# Patient Record
Sex: FEMALE | Race: WHITE | NOT HISPANIC OR LATINO | URBAN - METROPOLITAN AREA
[De-identification: names, ages, dates, MRNs, and addresses within clinical notes are randomized per-mention and may not be internally consistent; named-entity substitution may affect disease eponyms.]

---

## 2021-02-15 ENCOUNTER — MEDICAL CORRESPONDENCE (OUTPATIENT)
Dept: HEALTH INFORMATION MANAGEMENT | Facility: CLINIC | Age: 21
End: 2021-02-15

## 2021-02-15 ENCOUNTER — TRANSFERRED RECORDS (OUTPATIENT)
Dept: HEALTH INFORMATION MANAGEMENT | Facility: CLINIC | Age: 21
End: 2021-02-15

## 2021-02-18 ENCOUNTER — TRANSCRIBE ORDERS (OUTPATIENT)
Dept: OTHER | Age: 21
End: 2021-02-18

## 2021-02-18 DIAGNOSIS — R14.0 ABDOMINAL BLOATING: ICD-10-CM

## 2021-02-18 DIAGNOSIS — R14.1 ABDOMINAL GAS PAIN: ICD-10-CM

## 2021-02-18 DIAGNOSIS — K62.3 RECTAL PROLAPSE: Primary | ICD-10-CM

## 2022-10-17 ENCOUNTER — MEDICAL CORRESPONDENCE (OUTPATIENT)
Dept: HEALTH INFORMATION MANAGEMENT | Facility: CLINIC | Age: 22
End: 2022-10-17

## 2022-10-17 ENCOUNTER — TRANSFERRED RECORDS (OUTPATIENT)
Dept: HEALTH INFORMATION MANAGEMENT | Facility: CLINIC | Age: 22
End: 2022-10-17

## 2022-10-18 ENCOUNTER — TRANSFERRED RECORDS (OUTPATIENT)
Dept: HEALTH INFORMATION MANAGEMENT | Facility: CLINIC | Age: 22
End: 2022-10-18

## 2022-10-20 ENCOUNTER — TRANSCRIBE ORDERS (OUTPATIENT)
Dept: OTHER | Age: 22
End: 2022-10-20

## 2022-10-20 DIAGNOSIS — J35.8 TONSILLOLITH: Primary | ICD-10-CM

## 2022-10-21 NOTE — TELEPHONE ENCOUNTER
FUTURE VISIT INFORMATION      FUTURE VISIT INFORMATION:    Date: 1/20/23    Time: 9am    [place] [facility] [place]  REFERRAL INFORMATION:    Referring provider:  Dr Rosa Elena Garcia    Referring providers clinic:  [facility]    Reason for visit/diagnosis  Per pt/ Tonsillolith/ Ref Dr Rosa Elena Garcia @ [place]    RECORDS REQUESTED FROM:       Clinic name Comments Records Status Imaging Status   [facility] 10/18/22-- note from Dr Rosa Elena Garcia   Scanned in epic

## 2023-01-20 ENCOUNTER — OFFICE VISIT (OUTPATIENT)
Dept: OTOLARYNGOLOGY | Facility: CLINIC | Age: 23
End: 2023-01-20
Attending: FAMILY MEDICINE
Payer: COMMERCIAL

## 2023-01-20 ENCOUNTER — PRE VISIT (OUTPATIENT)
Dept: OTOLARYNGOLOGY | Facility: CLINIC | Age: 23
End: 2023-01-20

## 2023-01-20 VITALS
WEIGHT: 114.8 LBS | HEART RATE: 94 BPM | OXYGEN SATURATION: 99 % | BODY MASS INDEX: 21.67 KG/M2 | SYSTOLIC BLOOD PRESSURE: 114 MMHG | HEIGHT: 61 IN | DIASTOLIC BLOOD PRESSURE: 78 MMHG

## 2023-01-20 DIAGNOSIS — J35.8 TONSILLOLITH: ICD-10-CM

## 2023-01-20 DIAGNOSIS — J30.1 ALLERGIC RHINITIS DUE TO POLLEN, UNSPECIFIED SEASONALITY: ICD-10-CM

## 2023-01-20 DIAGNOSIS — J35.01 CHRONIC TONSILLITIS: Primary | ICD-10-CM

## 2023-01-20 PROCEDURE — 99203 OFFICE O/P NEW LOW 30 MIN: CPT | Performed by: OTOLARYNGOLOGY

## 2023-01-20 RX ORDER — NORETHINDRONE ACETATE AND ETHINYL ESTRADIOL AND FERROUS FUMARATE 1.5-30(21)
1 KIT ORAL
COMMUNITY
Start: 2022-11-20 | End: 2023-02-28

## 2023-01-20 RX ORDER — DOXYCYCLINE 100 MG/1
100 CAPSULE ORAL 2 TIMES DAILY
Qty: 14 CAPSULE | Refills: 0 | Status: SHIPPED | OUTPATIENT
Start: 2023-01-20 | End: 2023-01-27

## 2023-01-20 ASSESSMENT — PATIENT HEALTH QUESTIONNAIRE - PHQ9
SUM OF ALL RESPONSES TO PHQ QUESTIONS 1-9: 7
SUM OF ALL RESPONSES TO PHQ QUESTIONS 1-9: 7
10. IF YOU CHECKED OFF ANY PROBLEMS, HOW DIFFICULT HAVE THESE PROBLEMS MADE IT FOR YOU TO DO YOUR WORK, TAKE CARE OF THINGS AT HOME, OR GET ALONG WITH OTHER PEOPLE: SOMEWHAT DIFFICULT

## 2023-01-20 ASSESSMENT — PAIN SCALES - GENERAL
PAINLEVEL: NO PAIN (0)
PAINLEVEL: NO PAIN (0)

## 2023-01-20 NOTE — PROGRESS NOTES
The patient presents with a history of tonsilliths. She reports that she is having infections and stones in the every few weeks. She reports that she has post-nasal drainage and rhinitis and this does appear to be related to her tonsil symptoms. At times she also has moisture in the ear canals and itching of the ears.The patient denies sinusitis, facial pain or purulent nasal discharge. The patient denies otalgia, otorrhea, eustachian tube dysfunction, ear infections, dizziness or tinnitus. She reports four years of tonsilliths and tonsil infections. Her mother has experienced similar difficulty.     This patient is seen in consultation at the request of Dr. Rosa Eelna Garcia.     All other systems were reviewed and they are either negative or they are not directly pertinent to this Otolaryngology examination.      Past Medical History:    No past medical history on file.    Past Surgical History:    No past surgical history on file.    Medications:    No current outpatient medications on file.    Allergies:    Patient has no allergy information on record.    Physical Examination:    The patient is a well developed, well nourished female in no apparent distress.  She is normocephalic, atraumatic with pupils equally round and reactive to light.    Oral Cavity Examination:  Normal mucosa with no masses or lesions, examination performed with a flexible fiberoptic scope  Nasal Examination: Normal mucosa with no masses or lesions, 2+ cryptic tonsils with tonsilliths examination performed with a flexible fiberoptic scope  Ear Examination: Ear canals clear, tympanic membranes and middle ear spaces normal  Neurological Examination: Facial nerve function intact and symmetric  Integumentary Examination: No lesions on the skin of the head and neck  Neck Examination: No masses or lesions, no lymphadenopathy  Endocrine Examination: Normal thyroid examination    Assessment and Plan:    The patient presents with a history  of tonsilliths and recurrent tonsil infections. She reports a history of allergic rhinitis and at times moisture of the ear canals. She will be treated with a course of Doxycycline and CREST mouth rinse gargles. She will be referred for an allergy consultation with Dr. Charli Davis and she will be referred for consideration of a tonsilliths with Dr. Andrea Carr.    CC: Dr. Rosa Elena Garcia

## 2023-01-20 NOTE — TELEPHONE ENCOUNTER
FUTURE VISIT INFORMATION      FUTURE VISIT INFORMATION:    Date: 2/28/23    Time: 9:15am    Location: [facility] [place]  REFERRAL INFORMATION:    Referring provider:  Shayne Atwood MD     Referring providers clinic:  [facility]    Reason for visit/diagnosis  Ref by Angelic for Tonsillectomy    RECORDS REQUESTED FROM:       Clinic name Comments Records Status Imaging Status   [facility] 1/20/23- note with Shayne Atwood MD [facility] 10/18/22- note from Dr. Dacosta Scanned in epic

## 2023-01-20 NOTE — PATIENT INSTRUCTIONS
1. You were seen in the ENT Clinic today by Dr. Atwood.  If you have any questions or concerns after your appointment, please call   - Option 1: ENT Clinic: [phone]   - Option 2: Kristi (Dr. Atwood's Nurse): [phone]       Janice (Dr. Atwood's Nurse): [phone]    2.   Plan to return to clinic after allergy consult    3. Consult with Dr. Bill or Dr. Carr    4. Doxycycline- 1 tablet twice daily x 7 days    5. Allergy consult    6. Crest mouth wash gargle twice daily    How to Contact Us:  Send a [facility] message to your provider. Our team will respond to you via [place]. Occasionally, we will need to call you to get further information.  For urgent matters (Monday-Friday), call the ENT Clinic: [phone] and speak with a call center team member - they will route your call appropriately.   If you'd like to speak directly with a nurse, please find our contact information below. We do our best to check voicemail frequently throughout the day, and will work to call you back within 1-2 days. For urgent matters, please use the general clinic phone numbers listed above.    The patient presents with a history of tonsilliths and recurrent tonsil infections. She reports a history of allergic rhinitis and at times moisture of the ear canals. She will be treated with a course of Doxycycline and CREST mouth rinse gargles. She will be referred for an allergy consultation with Dr. Charli Davis and she will be referred for consideration of a tonsilliths with Dr. Andrea Carr.    Kristi ZAVALA LPN  [facility] - Otolaryngology

## 2023-02-27 NOTE — PROGRESS NOTES
Otolaryngology Clinic      Name: Marty Chau  MRN: [id number]  Age: 22 year old  : 2000  Referring provider: Shayne Atwood  2023      Chief Complaint:  Consultation    History of Present Illness:   Marty Chau is a 22 year old female with a history of tonsillitis who presents for consultation regarding tonsilloliths. The patient was evaluated on 10/18/22 by Dr. Dacosta and requested a referral to ENT for recurrent tonsilloliths, which she had been removing every 5 days. She was then seen by Dr. Atwood on 23, at which time she elaborated that she had had 4 years of recurrent tonsillitis. In addition to the stones, she had been enduring postnasal drip and rhinitis. Patient was put on doxycycline, started on Crest mouth rinses gargles, and referred to us for tonsilloliths.     Today, she reports that she has had tonsillitis for several years, but it has worsened in the past year. Prior to placement on antibiotics, she was able to get out 3 stones every 5 days. She has been using a Waterpik to remove them. She is also taking probiotics. After doxycycline, there is 1 stone produced every 5 days. Of note, she had strep throat once in the past.    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      Active Medications:     Current Outpatient Medications:      Magnesium Citrate 200 MG TABS, Take 500 mg by mouth daily, Disp: , Rfl:      Pediatric Multivitamins-Fl (MULTIVITAMIN WITH 1 MG FLUORIDE) 1 MG CHEW, Take 1 tablet by mouth daily, Disp: , Rfl:       Allergies:   Amoxicillin      Past Medical History:  No past medical history on file.  There is no problem list on file for this patient.       Past Surgical History:  No past surgical history on file.    Family History:   No family history on file.      Social History:        Review of Systems:   Pertinent items are noted in HPI or as in patient entered ROS below, remainder of complete ROS is  "negative.    ENT ROS 1/20/2023   Ears, Nose, Throat: Nasal congestion or drainage, Sore throat, Trouble swallowing, Hoarseness   Endocrine: Thirst         Physical Exam:   /81 (BP Location: Right arm, Patient Position: Sitting, Cuff Size: Adult Small)   Pulse 60   Temp 98.1  F (36.7  C) (Temporal)   Ht 1.549 m (5' 1\")   Wt 51.4 kg (113 lb 4.8 oz)   SpO2 99%   BMI 21.41 kg/m       Constitutional:  The patient was unaccompanied, well-groomed, and in no acute distress.    Skin:  Warm and pink.    Neurologic:  Alert and oriented x 3.  CN's III-XII within normal limits.  Voice normal.   Psychiatric:  The patient's affect was calm, cooperative, and appropriate.    Respiratory:  Breathing comfortably without stridor or exertion of accessory muscles.    Eyes: Extraocular movement intact.    Head:  Normocephalic and atraumatic.  No lesions or scars.    Ears:  Pinnae and tragus non-tender.  EAC's and TM's were clear.   Nose:  Sinuses were non-tender.  Anterior rhinoscopy revealed midline septum and absence of purulence or polyps.    OC/OP:  Normal tongue, floor of mouth, buccal mucosa, and palate.  No lesions or masses on inspection or palpation.  No abnormal lymph tissue in the oropharynx.  The pterygoid region is non-tender. 1-2 + Moderately sized tonsils.   Neck:  Supple with normal laryngeal and tracheal landmarks.  The parotid beds were without masses.  No palpable thyroid.  Lymphatic:  There is no palpable lymphadenopathy in the neck.     Assessment and Plan:  Marty Chau is a 22 year old female with a history of tonsillitis who presents for consultation regarding tonsilloliths. On exam, she has moderately sized tonsils. I discussed tonsillectomy, associated risks and benefits, as well as the recovery process. Based on the exam, I do not feel surgery is urgently indicated. Other, nonsurgical options were discussed and include antibiotic rinses, continued use of Waterpik, probiotics, etc. I also " recommended Biotene mouthwash as well as baking soda rinses. Patient is amenable to start with conservative management. She will follow up in 2 months with a phone visit.      Scribe Disclosure:  I, Elizabeth Fuentes, am serving as a scribe to document services personally performed by Fritz Bill MD at this visit, based upon the provider's statements to me. All documentation has been reviewed by the aforementioned provider prior to being entered into the official medical record.

## 2023-02-28 ENCOUNTER — PRE VISIT (OUTPATIENT)
Dept: OTOLARYNGOLOGY | Facility: CLINIC | Age: 23
End: 2023-02-28

## 2023-02-28 ENCOUNTER — OFFICE VISIT (OUTPATIENT)
Dept: OTOLARYNGOLOGY | Facility: CLINIC | Age: 23
End: 2023-02-28
Payer: COMMERCIAL

## 2023-02-28 VITALS
HEIGHT: 61 IN | SYSTOLIC BLOOD PRESSURE: 114 MMHG | OXYGEN SATURATION: 99 % | HEART RATE: 60 BPM | TEMPERATURE: 98.1 F | DIASTOLIC BLOOD PRESSURE: 81 MMHG | BODY MASS INDEX: 21.39 KG/M2 | WEIGHT: 113.3 LBS

## 2023-02-28 DIAGNOSIS — J35.8 TONSILLOLITH: Primary | ICD-10-CM

## 2023-02-28 PROCEDURE — 99214 OFFICE O/P EST MOD 30 MIN: CPT | Performed by: OTOLARYNGOLOGY

## 2023-02-28 ASSESSMENT — PAIN SCALES - GENERAL: PAINLEVEL: NO PAIN (0)

## 2023-02-28 NOTE — NURSING NOTE
"Chief Complaint   Patient presents with     Consult     tonsils    Blood pressure 114/81, pulse 60, temperature 98.1  F (36.7  C), temperature source Temporal, height 1.549 m (5' 1\"), weight 51.4 kg (113 lb 4.8 oz), SpO2 99 %. Shaina Ledezma LPN    "

## 2023-02-28 NOTE — PATIENT INSTRUCTIONS
"You were seen in the clinic today by Dr. Bill. If you have any questions or concerns after your appointment, please call the clinic at [phone]. Press \"1\" for scheduling, press \"3\" for nurse advice.    2.   The following has been recommended for you based upon your appointment today:   -Try using probiotics to help treat your recurring tonsil stones (a pill, kimjohanna, saurkraut).   -Use a waterpick to help clear your tonsils of any tonsil stones that accrue.   - Stop using your current mouthwash and switch to Biotene as a new mouth rinse.    3.   Plan to return the clinic in two months using a phone visit.    Candice BRENNAN, RN  [facility]  Department of Otolaryngology  [phone]      "

## 2023-02-28 NOTE — LETTER
2023     RE: Marty Chau  [address] [place] [id number]     Dear Colleague,    Thank you for referring your patient, Marty Chau, to the [facility] [place] [facility]. Please see a copy of my visit note below.      Otolaryngology Clinic      Name: Marty Chau  MRN: [id number]  Age: 22 year old  : 2000  Referring provider: Shayne Atwood  2023      Chief Complaint:  Consultation    History of Present Illness:   Marty Chau is a 22 year old female with a history of tonsillitis who presents for consultation regarding tonsilloliths. The patient was evaluated on 10/18/22 by Dr. Dacosta and requested a referral to ENT for recurrent tonsilloliths, which she had been removing every 5 days. She was then seen by Dr. Atwood on 23, at which time she elaborated that she had had 4 years of recurrent tonsillitis. In addition to the stones, she had been enduring postnasal drip and rhinitis. Patient was put on doxycycline, started on Crest mouth rinses gargles, and referred to us for tonsilloliths.     Today, she reports that she has had tonsillitis for several years, but it has worsened in the past year. Prior to placement on antibiotics, she was able to get out 3 stones every 5 days. She has been using a Waterpik to remove them. She is also taking probiotics. After doxycycline, there is 1 stone produced every 5 days. Of note, she had strep throat once in the past.    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      Active Medications:     Current Outpatient Medications:      Magnesium Citrate 200 MG TABS, Take 500 mg by mouth daily, Disp: , Rfl:      Pediatric Multivitamins-Fl (MULTIVITAMIN WITH 1 MG FLUORIDE) 1 MG CHEW, Take 1 tablet by mouth daily, Disp: , Rfl:       Allergies:   Amoxicillin      Past Medical History:  No past  "medical history on file.  There is no problem list on file for this patient.       Past Surgical History:  No past surgical history on file.    Family History:   No family history on file.      Social History:        Review of Systems:   Pertinent items are noted in HPI or as in patient entered ROS below, remainder of complete ROS is negative.    ENT ROS 1/20/2023   Ears, Nose, Throat: Nasal congestion or drainage, Sore throat, Trouble swallowing, Hoarseness   Endocrine: Thirst         Physical Exam:   /81 (BP Location: Right arm, Patient Position: Sitting, Cuff Size: Adult Small)   Pulse 60   Temp 98.1  F (36.7  C) (Temporal)   Ht 1.549 m (5' 1\")   Wt 51.4 kg (113 lb 4.8 oz)   SpO2 99%   BMI 21.41 kg/m       Constitutional:  The patient was unaccompanied, well-groomed, and in no acute distress.    Skin:  Warm and pink.    Neurologic:  Alert and oriented x 3.  CN's III-XII within normal limits.  Voice normal.   Psychiatric:  The patient's affect was calm, cooperative, and appropriate.    Respiratory:  Breathing comfortably without stridor or exertion of accessory muscles.    Eyes: Extraocular movement intact.    Head:  Normocephalic and atraumatic.  No lesions or scars.    Ears:  Pinnae and tragus non-tender.  EAC's and TM's were clear.   Nose:  Sinuses were non-tender.  Anterior rhinoscopy revealed midline septum and absence of purulence or polyps.    OC/OP:  Normal tongue, floor of mouth, buccal mucosa, and palate.  No lesions or masses on inspection or palpation.  No abnormal lymph tissue in the oropharynx.  The pterygoid region is non-tender. 1-2 + Moderately sized tonsils.   Neck:  Supple with normal laryngeal and tracheal landmarks.  The parotid beds were without masses.  No palpable thyroid.  Lymphatic:  There is no palpable lymphadenopathy in the neck.     Assessment and Plan:  Marty Chau is a 22 year old female with a history of tonsillitis who presents for consultation regarding " tonsilloliths. On exam, she has moderately sized tonsils. I discussed tonsillectomy, associated risks and benefits, as well as the recovery process. Based on the exam, I do not feel surgery is urgently indicated. Other, nonsurgical options were discussed and include antibiotic rinses, continued use of Waterpik, probiotics, etc. I also recommended Biotene mouthwash as well as baking soda rinses. Patient is amenable to start with conservative management. She will follow up in 2 months with a phone visit.      Scribe Disclosure:  I, Elizabeth Fuentes, am serving as a scribe to document services personally performed by Fritz Bill MD at this visit, based upon the provider's statements to me. All documentation has been reviewed by the aforementioned provider prior to being entered into the official medical record.    Again, thank you for allowing me to participate in the care of your patient.      Sincerely,    Fritz Bill MD

## 2023-03-08 ENCOUNTER — TELEPHONE (OUTPATIENT)
Dept: OTOLARYNGOLOGY | Facility: CLINIC | Age: 23
End: 2023-03-08
Payer: COMMERCIAL

## 2023-05-08 ENCOUNTER — HEALTH MAINTENANCE LETTER (OUTPATIENT)
Age: 23
End: 2023-05-08

## 2024-05-11 ENCOUNTER — HEALTH MAINTENANCE LETTER (OUTPATIENT)
Age: 24
End: 2024-05-11

## 2025-05-17 ENCOUNTER — HEALTH MAINTENANCE LETTER (OUTPATIENT)
Age: 25
End: 2025-05-17